# Patient Record
Sex: FEMALE
[De-identification: names, ages, dates, MRNs, and addresses within clinical notes are randomized per-mention and may not be internally consistent; named-entity substitution may affect disease eponyms.]

---

## 2019-09-01 ENCOUNTER — HOSPITAL (OUTPATIENT)
Dept: OTHER | Age: 64
End: 2019-09-01

## 2019-09-01 PROCEDURE — 99285 EMERGENCY DEPT VISIT HI MDM: CPT | Performed by: EMERGENCY MEDICINE

## 2019-12-27 ENCOUNTER — HOSPITAL ENCOUNTER (OUTPATIENT)
Dept: ULTRASOUND IMAGING | Age: 64
Discharge: HOME | End: 2019-12-27
Attending: FAMILY MEDICINE
Payer: MEDICARE

## 2019-12-27 DIAGNOSIS — R10.11 RUQ PAIN: ICD-10-CM

## 2019-12-27 PROCEDURE — 76705 ECHO EXAM OF ABDOMEN: CPT | Performed by: FAMILY MEDICINE

## 2020-01-22 ENCOUNTER — OFFICE VISIT (OUTPATIENT)
Dept: GASTROENTEROLOGY | Facility: CLINIC | Age: 65
End: 2020-01-22
Payer: COMMERCIAL

## 2020-01-22 ENCOUNTER — TELEPHONE (OUTPATIENT)
Dept: GASTROENTEROLOGY | Facility: CLINIC | Age: 65
End: 2020-01-22

## 2020-01-22 VITALS
BODY MASS INDEX: 42.46 KG/M2 | HEART RATE: 70 BPM | WEIGHT: 210.63 LBS | SYSTOLIC BLOOD PRESSURE: 129 MMHG | DIASTOLIC BLOOD PRESSURE: 77 MMHG | HEIGHT: 59 IN

## 2020-01-22 DIAGNOSIS — Z86.010 HISTORY OF COLON POLYPS: ICD-10-CM

## 2020-01-22 DIAGNOSIS — K62.5 RECTAL BLEEDING: ICD-10-CM

## 2020-01-22 DIAGNOSIS — K21.9 GASTROESOPHAGEAL REFLUX DISEASE, ESOPHAGITIS PRESENCE NOT SPECIFIED: ICD-10-CM

## 2020-01-22 DIAGNOSIS — R07.89 ATYPICAL CHEST PAIN: Primary | ICD-10-CM

## 2020-01-22 DIAGNOSIS — K21.9 GASTROESOPHAGEAL REFLUX DISEASE, ESOPHAGITIS PRESENCE NOT SPECIFIED: Primary | ICD-10-CM

## 2020-01-22 DIAGNOSIS — Z86.010 HISTORY OF ADENOMATOUS POLYP OF COLON: ICD-10-CM

## 2020-01-22 DIAGNOSIS — R07.89 ATYPICAL CHEST PAIN: ICD-10-CM

## 2020-01-22 DIAGNOSIS — R10.13 DYSPEPSIA: ICD-10-CM

## 2020-01-22 DIAGNOSIS — R13.10 DYSPHAGIA, UNSPECIFIED TYPE: ICD-10-CM

## 2020-01-22 PROCEDURE — 99204 OFFICE O/P NEW MOD 45 MIN: CPT | Performed by: INTERNAL MEDICINE

## 2020-01-22 RX ORDER — CARVEDILOL 12.5 MG/1
12.5 TABLET ORAL DAILY
COMMUNITY
Start: 2019-12-05

## 2020-01-22 RX ORDER — POLYETHYLENE GLYCOL 3350, SODIUM CHLORIDE, POTASSIUM CHLORIDE, SODIUM BICARBONATE, AND SODIUM SULFATE 240; 5.84; 2.98; 6.72; 22.72 G/4L; G/4L; G/4L; G/4L; G/4L
POWDER, FOR SOLUTION ORAL
Qty: 1 BOTTLE | Refills: 0 | Status: SHIPPED | OUTPATIENT
Start: 2020-01-22

## 2020-01-22 RX ORDER — PRAVASTATIN SODIUM 10 MG
10 TABLET ORAL DAILY
COMMUNITY
Start: 2019-11-18

## 2020-01-22 RX ORDER — IRBESARTAN AND HYDROCHLOROTHIAZIDE 300; 12.5 MG/1; MG/1
1 TABLET, FILM COATED ORAL DAILY
COMMUNITY
Start: 2019-09-26

## 2020-01-22 RX ORDER — LEVOTHYROXINE SODIUM 0.1 MG/1
1 TABLET ORAL DAILY
COMMUNITY
Start: 2019-12-04

## 2020-01-22 NOTE — PATIENT INSTRUCTIONS
Schedule EGD & colonoscopy exam at [facility] ([facility].)    This patient IS NOT appropriate for the [facility] ED endoscopy center. Body mass index is 42.54 kg/m².     MAC anesthesia     Golytely (PEG) 4L bowel prep --> [facility] [place]

## 2020-01-22 NOTE — PROGRESS NOTES
HPI:    Patient ID: Km Garcia is a 71year old woman with history hypertension, dyslipidemia, thyroid disease, BMI 42.5.     She is referred to us by Dr. Rupa Wilson for evaluation of recent abdominal symptoms, family history of \"stomach cancer\" in her mother Normal.  Common bile duct measures 5.6 mm. PANCREAS:      Normal.  No mass or ductal dilatation. RIGHT KIDNEY:             Visualized portions appear normal. The kidney length measures 11.44 cm. CONCLUSION:   1. Fatty liver.            DICTATED BY polypectomy. We discussed sedation options of conscious sedation versus MAC anesthesia, and agreed on MAC anesthesia.  We discussed the nature and risks of EGD and colonoscopy examination including sedation, anesthesia risks; bleeding, colonic injury or per

## 2020-01-22 NOTE — TELEPHONE ENCOUNTER
Scheduled for:  Colonoscopy [phone] ,[address]  Provider Name: Spencer Bryan  Date:  3/5/2020  Location:  [facility]  Sedation:  Mac  Time:  4080 ---- Inform pt that she will be notified on the day before prior to her arrival for procedure  Prep: Golytely   Meds/Allerg

## 2020-01-23 PROBLEM — K21.9 GASTROESOPHAGEAL REFLUX DISEASE: Status: ACTIVE | Noted: 2020-01-23

## 2020-01-23 PROBLEM — R07.89 ATYPICAL CHEST PAIN: Status: ACTIVE | Noted: 2020-01-23

## 2020-03-04 ENCOUNTER — TELEPHONE (OUTPATIENT)
Dept: GASTROENTEROLOGY | Facility: CLINIC | Age: 65
End: 2020-03-04

## 2020-03-04 NOTE — TELEPHONE ENCOUNTER
Recalled patient to check bp she states its 205/98. Instructed to proceed to immediate/urgent care. States  will drive her. Will update us if procedure tomorrow needs to be canceled.

## 2020-03-04 NOTE — TELEPHONE ENCOUNTER
Dr. Margarita Perez-    Pt's BP is 205/98. She will be going to urgent care for further work up. Has CLN tomorrow @ 3829. Should we cancel the procedure.      Thank You

## 2020-03-04 NOTE — TELEPHONE ENCOUNTER
Spoke to patient she did not take her BP medication today. She will take her BP now and recheck the BP in one hour if it is still high she will call me back. She denies HA, dizziness, chest pain SOB. She is to hold her BP medication tomorrow only.  She v

## 2020-03-04 NOTE — TELEPHONE ENCOUNTER
I spoke to the pt.      She went to urgent care     Her B/P @ Urgent Care was 170/80    Per Dr Brien Nguyen we should still cancel    I spoke to the pt and she is aware her procedure is cancelled for tomorrow    I told her the surgery schedulers will get back in

## 2020-03-05 NOTE — TELEPHONE ENCOUNTER
Noted, I contacted Donis Hammans RN at [facility] and OMP sent . Forwarded to schedulers to reschedule. Thanks.

## 2020-03-06 NOTE — TELEPHONE ENCOUNTER
Dr. Shoaib Higgins - please place order for another Golytely prep. Pt already opened & mixed her original prep. [facility] has been added. Thank you!

## 2020-03-06 NOTE — TELEPHONE ENCOUNTER
Scheduled for:  Colonoscopy - 92614 & EGD - 88215  Provider Name:  Dr. Elena Nguyen  Date:  4/16/20  Location:  [facility]  Sedation:  MAC  Time:  Approx 9:30 am (pt is aware that [facility] will call with exact arrival time)  Prep:  Golytely, Prep instructions were given to

## 2020-04-06 ENCOUNTER — TELEPHONE (OUTPATIENT)
Dept: GASTROENTEROLOGY | Facility: CLINIC | Age: 65
End: 2020-04-06

## 2020-04-06 DIAGNOSIS — R07.89 ATYPICAL CHEST PAIN: ICD-10-CM

## 2020-04-06 DIAGNOSIS — Z86.010 HISTORY OF COLON POLYPS: ICD-10-CM

## 2020-04-06 DIAGNOSIS — R10.13 DYSPEPSIA: ICD-10-CM

## 2020-04-06 DIAGNOSIS — R13.10 DYSPHAGIA, UNSPECIFIED TYPE: ICD-10-CM

## 2020-04-06 DIAGNOSIS — K21.9 GASTROESOPHAGEAL REFLUX DISEASE, ESOPHAGITIS PRESENCE NOT SPECIFIED: Primary | ICD-10-CM

## 2020-04-06 DIAGNOSIS — K62.5 RECTAL BLEEDING: ICD-10-CM

## 2020-04-06 NOTE — TELEPHONE ENCOUNTER
Rescheduled for:  Colonoscopy - 50824; [phone]  Provider Name:  Dr. Oneil Constant  Date: From: 4/16/20 To: 06/25/2020  Location:  [facility]  Sedation:  MAC  Time: From:9:30 am To: 1:45pm   Prep:  Mary Ellen, Prep instructions were given to pt over the phone, pt verbali

## 2020-04-06 NOTE — TELEPHONE ENCOUNTER
Patient requesting to reschedule colonoscopy and EGD scheduled 7/37/42 due to public health concerns. Patient requesting to reschedule for June-July, informed about the 72 hour call back, thanks.

## 2020-04-21 ENCOUNTER — TELEPHONE (OUTPATIENT)
Dept: GASTROENTEROLOGY | Facility: CLINIC | Age: 65
End: 2020-04-21

## 2020-04-21 DIAGNOSIS — K62.5 RECTAL BLEEDING: ICD-10-CM

## 2020-04-21 DIAGNOSIS — Z86.010 HISTORY OF COLON POLYPS: ICD-10-CM

## 2020-04-21 DIAGNOSIS — R07.89 ATYPICAL CHEST PAIN: ICD-10-CM

## 2020-04-21 DIAGNOSIS — R13.10 DYSPHAGIA, UNSPECIFIED TYPE: ICD-10-CM

## 2020-04-21 DIAGNOSIS — K21.9 GASTROESOPHAGEAL REFLUX DISEASE, ESOPHAGITIS PRESENCE NOT SPECIFIED: Primary | ICD-10-CM

## 2020-04-21 RX ORDER — PANTOPRAZOLE SODIUM 40 MG/1
40 TABLET, DELAYED RELEASE ORAL 2 TIMES DAILY
Qty: 60 TABLET | Refills: 11 | Status: SHIPPED | OUTPATIENT
Start: 2020-04-21 | End: 2020-05-21

## 2020-04-21 NOTE — TELEPHONE ENCOUNTER
Recent consultation of 1/22/2020 reviewed. Severe GERD symptoms, lower substernal burning chest pain and epigastric abdominal pain. 1.  Yes, we should absolutely try a PPI medication. Prescription sent in today for pantoprazole twice daily.   Ideally s

## 2020-04-21 NOTE — TELEPHONE ENCOUNTER
Forwarded to GI schedulers. Thanks. Patient contacted and informed of below message/instructions and that schedulers will be calling to get her in earlier than current June appt.

## 2020-04-21 NOTE — TELEPHONE ENCOUNTER
Dr Dawson Hannon, patient contacted. You saw in office 1/22/2020. She was to have 4/16 egd/colon but rescheduled to 6/25 due to COVID 19. States her epigastric pain is worsening, especially after meals. Has gall bladder. She is not taking any PPIs.  Denies fever,

## 2020-04-22 NOTE — TELEPHONE ENCOUNTER
Rescheduled for:  Colonoscopy [phone] and EGD 16303  Provider Name:  Dr. Kanu Noland  Date:    From-6/25/20  To-5/26/20  [facility]  Sedation:  MAC  Time:    From-1345  To-1500 (pt is aware to arrive at 1400)   Prep:  Raina Hyde n

## 2020-04-22 NOTE — TELEPHONE ENCOUNTER
Dr. Feliciano Reid-    When I scheduled this patient I informed her to not take her Irbesartan-HCTZ but she stated that the last time she scheduled her procedure she did hold her BP meds and she ended up with 205/98 BP and was sent to the immediate care which the

## 2020-05-22 ENCOUNTER — TELEPHONE (OUTPATIENT)
Dept: GASTROENTEROLOGY | Facility: CLINIC | Age: 65
End: 2020-05-22

## 2020-05-22 NOTE — PAT NURSING NOTE
Pt.unable to locate prep instructions for Colonoscopy. Spoke with [facility] from Dr. Tom Santos office, who said she will call pt.with instructions.

## 2020-05-22 NOTE — TELEPHONE ENCOUNTER
Patient states she is having issues obtaining instructions. Requesting to speak with RN. Please call. Thank you.

## 2020-05-22 NOTE — TELEPHONE ENCOUNTER
Per Endo nurse, patient needs prep instructions sent to her please. Foreign has colonoscopy scheduled for 05/26/2020. Patient speaks [place]. Please follow up. Thank you.

## 2020-05-25 ENCOUNTER — LAB ENCOUNTER (OUTPATIENT)
Dept: LAB | Facility: HOSPITAL | Age: 65
End: 2020-05-25
Attending: INTERNAL MEDICINE
Payer: MEDICARE

## 2020-05-25 DIAGNOSIS — R13.10 DYSPHAGIA: ICD-10-CM

## 2020-05-25 DIAGNOSIS — K21.9 GASTROESOPHAGEAL REFLUX DISEASE, ESOPHAGITIS PRESENCE NOT SPECIFIED: ICD-10-CM

## 2020-05-25 DIAGNOSIS — K62.5 RECTAL BLEEDING: ICD-10-CM

## 2020-05-25 DIAGNOSIS — K20.90 ESOPHAGITIS: ICD-10-CM

## 2020-05-25 DIAGNOSIS — K63.5 COLON POLYPS: ICD-10-CM

## 2020-05-26 ENCOUNTER — TELEPHONE (OUTPATIENT)
Dept: GASTROENTEROLOGY | Facility: CLINIC | Age: 65
End: 2020-05-26

## 2020-05-26 ENCOUNTER — ANESTHESIA (OUTPATIENT)
Dept: ENDOSCOPY | Facility: HOSPITAL | Age: 65
End: 2020-05-26
Payer: MEDICARE

## 2020-05-26 ENCOUNTER — HOSPITAL ENCOUNTER (OUTPATIENT)
Facility: HOSPITAL | Age: 65
Setting detail: HOSPITAL OUTPATIENT SURGERY
Discharge: HOME | End: 2020-05-26
Attending: INTERNAL MEDICINE | Admitting: INTERNAL MEDICINE
Payer: MEDICARE

## 2020-05-26 ENCOUNTER — ANESTHESIA EVENT (OUTPATIENT)
Dept: ENDOSCOPY | Facility: HOSPITAL | Age: 65
End: 2020-05-26
Payer: MEDICARE

## 2020-05-26 VITALS
RESPIRATION RATE: 14 BRPM | WEIGHT: 210 LBS | HEART RATE: 74 BPM | HEIGHT: 60 IN | SYSTOLIC BLOOD PRESSURE: 122 MMHG | OXYGEN SATURATION: 98 % | BODY MASS INDEX: 41.23 KG/M2 | TEMPERATURE: 98 F | DIASTOLIC BLOOD PRESSURE: 85 MMHG

## 2020-05-26 DIAGNOSIS — R13.10 DYSPHAGIA: ICD-10-CM

## 2020-05-26 DIAGNOSIS — R07.89 ATYPICAL CHEST PAIN: ICD-10-CM

## 2020-05-26 DIAGNOSIS — K20.90 ESOPHAGITIS: ICD-10-CM

## 2020-05-26 DIAGNOSIS — R13.10 DYSPHAGIA, UNSPECIFIED TYPE: ICD-10-CM

## 2020-05-26 DIAGNOSIS — K62.5 RECTAL BLEEDING: ICD-10-CM

## 2020-05-26 DIAGNOSIS — Z86.010 HISTORY OF COLON POLYPS: ICD-10-CM

## 2020-05-26 DIAGNOSIS — K63.5 COLON POLYPS: ICD-10-CM

## 2020-05-26 DIAGNOSIS — K21.9 GASTROESOPHAGEAL REFLUX DISEASE, ESOPHAGITIS PRESENCE NOT SPECIFIED: Primary | ICD-10-CM

## 2020-05-26 PROCEDURE — 45385 COLONOSCOPY W/LESION REMOVAL: CPT | Performed by: INTERNAL MEDICINE

## 2020-05-26 PROCEDURE — 0DBP8ZX EXCISION OF RECTUM, VIA NATURAL OR ARTIFICIAL OPENING ENDOSCOPIC, DIAGNOSTIC: ICD-10-PCS | Performed by: INTERNAL MEDICINE

## 2020-05-26 PROCEDURE — 43239 EGD BIOPSY SINGLE/MULTIPLE: CPT | Performed by: INTERNAL MEDICINE

## 2020-05-26 PROCEDURE — 0DB78ZX EXCISION OF STOMACH, PYLORUS, VIA NATURAL OR ARTIFICIAL OPENING ENDOSCOPIC, DIAGNOSTIC: ICD-10-PCS | Performed by: INTERNAL MEDICINE

## 2020-05-26 PROCEDURE — 0DB68ZX EXCISION OF STOMACH, VIA NATURAL OR ARTIFICIAL OPENING ENDOSCOPIC, DIAGNOSTIC: ICD-10-PCS | Performed by: INTERNAL MEDICINE

## 2020-05-26 RX ORDER — SODIUM CHLORIDE, SODIUM LACTATE, POTASSIUM CHLORIDE, CALCIUM CHLORIDE 600; 310; 30; 20 MG/100ML; MG/100ML; MG/100ML; MG/100ML
INJECTION, SOLUTION INTRAVENOUS CONTINUOUS
Status: DISCONTINUED | OUTPATIENT
Start: 2020-05-26 | End: 2020-05-26

## 2020-05-26 NOTE — ANESTHESIA PREPROCEDURE EVALUATION
Anesthesia PreOp Note    HPI:     Sridevi Lang is a 76year old female who presents for preoperative consultation requested by: Elliott Wang MD    Date of Surgery: 5/26/2020    Procedure(s):  COLONOSCOPY  ESOPHAGOGASTRODUODENOSCOPY (EGD)  Indica Juan Carlisle MD, Last Rate: 20 mL/hr at 05/26/20 1464    No current Epic-ordered outpatient medications on file. No Known Allergies    History reviewed. No pertinent family history.   Social History    Socioeconomic History      Marital status:       S (abnormal). Her respiration is 24 and oxygen saturation is 96%.     05/22/20  0947 05/26/20  1442   BP:  158/74   Pulse:  (!) 24   Resp:  24   Temp:  97.9 °F (36.6 °C)   TempSrc:  Oral   SpO2:  96%   Weight: 95.3 kg (210 lb)    Height: 1.524 m (5')

## 2020-05-26 NOTE — H&P
History & Physical Examination    Patient Name: Zaki Sam  MRN: [id number]  CSN: [id number]  YOB: 1955    Diagnosis: GERD, atypical chest pain, dysphagia, dyspepsia, history or colon polyps, rectal bleeding    Present Illness:     76 year ol Disp: , Rfl: , 5/24/2020  PEG 3350-KCl-NaBcb-NaCl-NaSulf (COLYTE WITH FLAVOR PACKS) 240 g Oral Recon Soln, Take as directed in the colonoscopy instructions. , Disp: 4000 mL, Rfl: 0  PEG 3350-KCl-NaBcb-NaCl-NaSulf (PEG 3350/ELECTROLYTES) 240 g Oral Recon Sol

## 2020-05-26 NOTE — ANESTHESIA POSTPROCEDURE EVALUATION
Patient: Todd Peña    Procedure Summary     Date:  05/26/20 Room / Location:  [facility] ENDOSCOPY 04 / [facility] ENDOSCOPY    Anesthesia Start:  4005 Anesthesia Stop:  1600    Procedures:       COLONOSCOPY (N/A )      ESOPHAGOGASTRODUODENOSCOPY (EGD) (N/A ) Diagnosis

## 2020-05-26 NOTE — OPERATIVE REPORT
EGD AND COLONOSCOPY PROCEDURE REPORTS:    DATE OF PROCEDURE:  5/26/2020    PCP: Kassy Mosqueda MD     PREOPERATIVE DIAGNOSIS:  GERD, atypical chest pain, dysphagia, dyspepsia, history or colon polyps, rectal bleeding     POSTOPERATIVE DIAGNOSIS:  See im ascending colon to the hepatic flexure. Cecum was confirmed by landmarks, including appendiceal orifice, cecal trifold, ileocecal valve. Retroflexion was performed in the rectum. The quality of the prep was excellent.      COLONOSCOPY FINDINGS:  · Sess

## 2020-06-22 ENCOUNTER — TELEPHONE (OUTPATIENT)
Dept: GASTROENTEROLOGY | Facility: CLINIC | Age: 65
End: 2020-06-22

## 2020-06-22 NOTE — TELEPHONE ENCOUNTER
Lab letter mailed out to patient and recall entered for repeat Colon in 7 yrs,5/26/2027 and Egd in 5 yrs,5/26/2025 per . Bhakti Lane MD  [facility] Clinical Staff             GI RNs - 1.  Please print and mail this letter to patient;

## 2020-06-22 NOTE — TELEPHONE ENCOUNTER
Recall for repeat Egd in 5 yrs, 5/26/2025 per . Loretta Ramírez MD  [facility] Clinical Staff             GI RNs - 1. Please print and mail this letter to patient; 2. Recall for EGD exam in 5 years, 3.  recall for colonoscopy examination in

## 2024-10-14 ENCOUNTER — TELEPHONE (OUTPATIENT)
Facility: CLINIC | Age: 69
End: 2024-10-14

## 2024-10-14 NOTE — TELEPHONE ENCOUNTER
Patient called to find out when she is due for colonoscopy/Esophagogastroduodenoscopy.  Please call.

## 2024-10-16 NOTE — TELEPHONE ENCOUNTER
Last Procedure, Date, MD:  05/26/2020 Dr Martin, EGD Colon  Last Diagnosis:  see path report below  Recalled (mth/yrs): 5 years for EGD, 7-10 years for colonoscopy  Sedation Used Previously:  MAC  Last Prep Used (if known):    Quality Of Prep (if known): excellent  Anticoagulants:no  Diabetic Med's (PO/Injectables): no  Weight loss Med's: no  Iron/Herbal/Multivitamin Supplements (RX/OTC): no  Marijuana/Vaping/CBD:no  Height & Weight: 5'/228 lbs  BMI: 44.5  Hx of Cardiac/CVA Issues/(MI/Stroke): no  Devices Pacemaker/Defibrillator/Stents: no  Respiratory Issues/Oxygen Use/ADÁN/COPD: ADÁN/CPAP  Issues w/ Anesthesia: no    Symptoms (Y/N):  pt is due for EGD in 2025. She is experiencing blood in her stool and asking if she can have a colonoscopy with the EGD      Special Comments/Notes:LOV with Dr Gordon was 08/30/2024. Medications allergies and pharmacy verified with the pt    Please advise on orders and prep.     Thank you!     Jason Martin MD   Physician  Gastroenterology     Operative Report  Addendum     Date of Service: 5/26/2020  3:05 PM  Case Time: Procedures: Surgeons:   5/26/2020  3:23 PM COLONOSCOPY   ESOPHAGOGASTRODUODENOSCOPY (EGD)    Jason Martin MD                 EGD AND COLONOSCOPY PROCEDURE REPORTS:     DATE OF PROCEDURE:  5/26/2020     PCP: LENORE GORDON MD     PREOPERATIVE DIAGNOSIS:  GERD, atypical chest pain, dysphagia, dyspepsia, history or colon polyps, rectal bleeding     POSTOPERATIVE DIAGNOSIS:  See impression.     SURGEON:  Jason Martin M.D.     SEDATION:    MAC anesthesia provided by the Anesthesia Service.  MAC anesthesia requested due to  anticipated intolerance of the EGD and colonoscopy examination under safe doses conscious sedation medications     Body mass index is 41.01 kg/m².      EGD PROCEDURE:    After the nature and risks of EGD and colonoscopy examinations under MAC anesthesia were discussed with the patient and all questions answered,  informed consent was obtained.  The patient was sedated as above.       Once sedated, the Olympus adult diagnostic gastroscope was placed in the patient's mouth and advanced under direct visualization through the oropharynx into the esophagus, on down through the stomach and pylorus to the duodenal bulb and second portions of the duodenum.  Retroflexion was performed in the stomach.     EGD FINDINGS:    Esophagus and GE junction: 3+ cm hiatal hernia without active reflux esophagitis.  Photograph taken.     Stomach: Clear secretions present.  Mild diffuse atrophic gastritis proximally and distally.  No erosion or ulceration.  Random gastric mucosal biopsies taken circumferentially and a segmental fashion, fundus, body, antrum today to evaluate family history of gastric cancer.     Duodenum: Clear secretions present.  Normal duodenal bulb and second portion.     COLONOSCOPY PROCEDURE:    The patient was repositioned for colonoscopy examination.  Additional sedation given.  Digital rectal exam was performed, which showed no masses.  The Olympus pediatric video colonoscope was advanced under direct visualization through the entire length of the colon to the cecum and terminal ileum.  Retroflex exam performed up the ascending colon to the hepatic flexure.  Cecum was confirmed by landmarks, including appendiceal orifice, cecal trifold, ileocecal valve.  Retroflexion was performed in the rectum.     The quality of the prep was excellent.     COLONOSCOPY FINDINGS:  Sessile 6 mm polyp removed from the rectum by cold snare polypectomy, suctioned out.  Small internal hemorrhoids.     IMPRESSION:  Small to medium sized hiatal hernia without active reflux esophagitis as above.  Mild atrophic gastritis in a patient with family history of gastric cancer.  Gastric mucosal biopsies obtained.  Single small colon polyp removed from the rectum as above.     RECOMMENDATIONS:  Followup above gastric mucosal biopsy results.  Follow-up  above colon polyp pathology results.  High-fiber diet.  Consider repeat EGD examination in 5 years due to family history gastric cancer in a parent.  Repeat colonoscopy examination in 5-7 years, or as per family history moving forward.         Electronically signed by Jason Martin MD at 5/26/2020  4:06 PM  ==========================  Final Diagnosis:      A.  Gastric antrum; biopsy:  Fragments of gastric mucosa demonstrating minimal/mild chronic inactive gastritis.   No evidence of gastric glandular atrophy, intestinal metaplasia, epithelial dysplasia, or malignancy identified.   No evidence of bacterial organisms consistent with Helicobacter species seen on Giemsa stain (with appropriate control reactivity).     B.  Gastric body; biopsy:  Fragments of gastric mucosa demonstrating minimal chronic inactive gastritis.   No evidence of gastric glandular atrophy, intestinal metaplasia, epithelial dysplasia, or malignancy identified.   No evidence of bacterial organisms consistent with Helicobacter species seen on Giemsa stain (with appropriate control reactivity).     C.  Gastric fundus; biopsy:  Fragments of gastric mucosa demonstrating minimal chronic inactive gastritis.   No evidence of gastric glandular atrophy, intestinal metaplasia, epithelial dysplasia, or malignancy identified.   No evidence of bacterial organisms consistent with Helicobacter species seen on Giemsa stain (with appropriate control reactivity).     D.  Rectal polyp; polypectomy:  Fragment of hyperplastic polyp.

## 2024-10-16 NOTE — TELEPHONE ENCOUNTER
Lab letter mailed out to patient and recall entered for repeat Colon in 7 yrs,5/26/2027 and Egd in 5 yrs,5/26/2025 per .     Jason Martin MD  P  Gi Clinical Staff              GI RNs - 1. Please print and mail this letter to patient; 2. Recall for EGD exam in 5 years, 3. recall for colonoscopy examination in 7 years.

## 2024-10-19 RX ORDER — POLYETHYLENE GLYCOL 3350, SODIUM SULFATE ANHYDROUS, SODIUM BICARBONATE, SODIUM CHLORIDE, POTASSIUM CHLORIDE 236; 22.74; 6.74; 5.86; 2.97 G/4L; G/4L; G/4L; G/4L; G/4L
4 POWDER, FOR SOLUTION ORAL ONCE
Qty: 1 EACH | Refills: 0 | Status: SHIPPED | OUTPATIENT
Start: 2024-10-19 | End: 2024-10-19

## 2024-10-19 NOTE — TELEPHONE ENCOUNTER
Thanks all.    Patient with family history of gastric cancer in a parent, atrophic gastritis.  I recommended every 5-year EGD examinations.    LOV with Dr Falk was 08/30/2024    My previous EGD and colonoscopy examinations 5/26/2020 reviewed.  Reassuring EGD examination showing atrophic gastritis; biopsies reassuring.  Colonoscopy examination showed small rectal polyp.    GI schedulers, please call Ms. Soto to schedule EGD examination.  Please advise for the colonoscopy examination is optional but not recommended.  Her previous exam 4.5 years ago showed one completely benign polyp, very reassuring.  If she is really that worried we can proceed with another colonoscopy if she insists.    GI schedulers, please call Ms. Soto to schedule EGD & ??colonoscopy exam at [facility]    4/1/2025 or later    BMI Readings from Last 1 Encounters:   05/22/20 41.01 kg/m²      MAC anesthesia     Golytely (PEG) 4L bowel prep  [facility]    Dx = dyspepsia, family history gastric cancer in a parent, rectal bleeding    Medication instructions:    None

## 2024-10-21 NOTE — TELEPHONE ENCOUNTER
Left message for patient to call back to schedule Colonoscopy/EGD     Please transfer call to GI surgery scheduling

## 2025-04-11 ENCOUNTER — TELEPHONE (OUTPATIENT)
Facility: CLINIC | Age: 70
End: 2025-04-11

## 2025-04-11 NOTE — TELEPHONE ENCOUNTER
Patient states that the [facility] has not received prescription for the colonoscopy preparation.

## 2025-04-11 NOTE — TELEPHONE ENCOUNTER
[employer] Interpretor #[id number] used to call the patient    I spoke to the patient    Patient requesting bowel prep be sent to the pharmacy    Patient would like bowel prep sent to [facility]    Bowel prep sent to [facility] on 10/19/2024    RN called and spoke to pharmacy staff at [facility]    RN informed pharmacy staff that bowel prep was sent on 10/19/2024 and that patient will need this filled for upcoming procedure    Pharmacy to have bowel prep ready today and notify patient when it is ready for pickup

## 2025-07-21 RX ORDER — LOSARTAN POTASSIUM 100 MG/1
100 TABLET ORAL DAILY
COMMUNITY

## 2025-07-21 RX ORDER — SPIRONOLACTONE 50 MG/1
50 TABLET, FILM COATED ORAL 2 TIMES DAILY
COMMUNITY

## 2025-07-25 ENCOUNTER — ANESTHESIA EVENT (OUTPATIENT)
Dept: ENDOSCOPY | Facility: HOSPITAL | Age: 70
End: 2025-07-25
Payer: MEDICARE

## 2025-07-25 ENCOUNTER — HOSPITAL ENCOUNTER (OUTPATIENT)
Facility: HOSPITAL | Age: 70
Setting detail: HOSPITAL OUTPATIENT SURGERY
Discharge: HOME | End: 2025-07-25
Attending: INTERNAL MEDICINE | Admitting: INTERNAL MEDICINE

## 2025-07-25 ENCOUNTER — ANESTHESIA (OUTPATIENT)
Dept: ENDOSCOPY | Facility: HOSPITAL | Age: 70
End: 2025-07-25
Payer: MEDICARE

## 2025-07-25 VITALS
WEIGHT: 230 LBS | HEART RATE: 69 BPM | DIASTOLIC BLOOD PRESSURE: 44 MMHG | OXYGEN SATURATION: 95 % | BODY MASS INDEX: 49.62 KG/M2 | HEIGHT: 57 IN | RESPIRATION RATE: 18 BRPM | SYSTOLIC BLOOD PRESSURE: 132 MMHG

## 2025-07-25 DIAGNOSIS — Z80.0 FAMILY HISTORY OF COLON CANCER: ICD-10-CM

## 2025-07-25 DIAGNOSIS — K62.5 RECTAL BLEEDING: ICD-10-CM

## 2025-07-25 PROBLEM — K64.8 INTERNAL HEMORRHOIDS: Status: ACTIVE | Noted: 2025-07-25

## 2025-07-25 PROCEDURE — 43239 EGD BIOPSY SINGLE/MULTIPLE: CPT | Performed by: INTERNAL MEDICINE

## 2025-07-25 PROCEDURE — 45385 COLONOSCOPY W/LESION REMOVAL: CPT | Performed by: INTERNAL MEDICINE

## 2025-07-25 RX ORDER — LIDOCAINE HYDROCHLORIDE 10 MG/ML
INJECTION, SOLUTION EPIDURAL; INFILTRATION; INTRACAUDAL; PERINEURAL AS NEEDED
Status: DISCONTINUED | OUTPATIENT
Start: 2025-07-25 | End: 2025-07-25 | Stop reason: SURG

## 2025-07-25 RX ORDER — SODIUM CHLORIDE, SODIUM LACTATE, POTASSIUM CHLORIDE, CALCIUM CHLORIDE 600; 310; 30; 20 MG/100ML; MG/100ML; MG/100ML; MG/100ML
INJECTION, SOLUTION INTRAVENOUS CONTINUOUS
Status: DISCONTINUED | OUTPATIENT
Start: 2025-07-25 | End: 2025-07-25

## 2025-07-25 RX ORDER — NALOXONE HYDROCHLORIDE 0.4 MG/ML
0.08 INJECTION, SOLUTION INTRAMUSCULAR; INTRAVENOUS; SUBCUTANEOUS ONCE AS NEEDED
Status: DISCONTINUED | OUTPATIENT
Start: 2025-07-25 | End: 2025-07-25

## 2025-07-25 RX ORDER — LIDOCAINE HYDROCHLORIDE 20 MG/ML
SOLUTION OROPHARYNGEAL AS NEEDED
Status: DISCONTINUED | OUTPATIENT
Start: 2025-07-25 | End: 2025-07-25 | Stop reason: SURG

## 2025-07-25 RX ADMIN — LIDOCAINE HYDROCHLORIDE 10 ML: 20 SOLUTION OROPHARYNGEAL at 12:41:00

## 2025-07-25 RX ADMIN — LIDOCAINE HYDROCHLORIDE 50 MG: 10 INJECTION, SOLUTION EPIDURAL; INFILTRATION; INTRACAUDAL; PERINEURAL at 12:46:00

## 2025-07-25 RX ADMIN — SODIUM CHLORIDE, SODIUM LACTATE, POTASSIUM CHLORIDE, CALCIUM CHLORIDE: 600; 310; 30; 20 INJECTION, SOLUTION INTRAVENOUS at 12:41:00

## 2025-07-25 RX ADMIN — SODIUM CHLORIDE, SODIUM LACTATE, POTASSIUM CHLORIDE, CALCIUM CHLORIDE: 600; 310; 30; 20 INJECTION, SOLUTION INTRAVENOUS at 13:25:00

## 2025-07-25 NOTE — H&P
History & Physical Examination    Patient Name: Izabella Soto  MRN: [id number]  [facility]: [id number]  YOB: 1955    Diagnosis: Dyspepsia, family history gastric cancer in a parent, rectal bleeding    PRESENT ILLNESS: 70-year-old woman with BMI 49.8, history of hypertension dyslipidemia known to me from previous exams who presents for EGD and colonoscopy examination.      BMI Readings from Last 1 Encounters:   25 49.77 kg/m²         Prescriptions Prior to Admission[1]  Current Hospital Medications[2]    Allergies: Allergies[3]    Past Medical History[4]  Past Surgical History[5]  Family History[6]  Social History     Tobacco Use    Smoking status: Former     Current packs/day: 0.00     Types: Cigarettes     Quit date: 1998     Years since quittin.5    Smokeless tobacco: Never   Substance Use Topics    Alcohol use: Never       SYSTEM Check if Review is Normal Check if Physical Exam is Normal If not normal, please explain:   HEENT [ ] [X ]    NECK & BACK [ ] [ ]    HEART [ X ] [ X ]    LUNGS [ X ] [ X ]    ABDOMEN [ X ] [ X ]    UROGENITAL [ ] [ ]    EXTREMITIES [ ] [ ]    OTHER        See Anesthesia documentation    [ x ] I have discussed the risks and benefits and alternatives with the patient/family.  They understand and agree to proceed with plan of care.  [ x ] I have reviewed the History and Physical done within the last 30 days.  Any changes noted above.    Jason Martin MD  2025  11:31 AM             [1]   Medications Prior to Admission   Medication Sig Dispense Refill Last Dose/Taking    losartan 100 MG Oral Tab Take 1 tablet (100 mg total) by mouth daily.   Taking    spironolactone 50 MG Oral Tab Take 1 tablet (50 mg total) by mouth 2 (two) times daily.   Taking    Omega-3 Fatty Acids (OMEGA 3 OR) Take by mouth.   Taking    Cholecalciferol (VITAMIN D3 OR) Take by mouth.   Taking    Ascorbic Acid (VITAMIN C OR) Take by mouth.   Taking    MAGNESIUM OR Take by mouth.   Taking     Cyanocobalamin (VITAMIN B 12 OR) Take by mouth.   Taking    ZINC OR Use as directed in the mouth or throat.   Taking    Menaquinone-7 (VITAMIN K2 OR) Take by mouth.   Taking    carvedilol 12.5 MG Oral Tab Take 2 tablets (25 mg total) by mouth 2 (two) times daily with meals.   Taking    Levothyroxine Sodium 100 MCG Oral Tab Take 1 tablet (100 mcg total) by mouth in the morning.   Taking    Pravastatin Sodium 10 MG Oral Tab Take 1 tablet (10 mg total) by mouth in the morning.   Taking    PEG 3350-KCl-NaBcb-NaCl-NaSulf (COLYTE WITH FLAVOR PACKS) 240 g Oral Recon Soln Take as directed in the colonoscopy instructions. 4000 mL 0     Irbesartan-hydroCHLOROthiazide 300-12.5 MG Oral Tab Take 1 tablet by mouth daily. (Patient not taking: Reported on 10/16/2024)       PEG 3350-KCl-NaBcb-NaCl-NaSulf (PEG 3350/ELECTROLYTES) 240 g Oral Recon Soln Take as directed according to colonoscopy instructions 1 Bottle 0    [2]   No current facility-administered medications for this encounter.   [3] No Known Allergies  [4]   Past Medical History:   Disorder of thyroid    Essential hypertension    High blood pressure    High cholesterol    Hyperlipidemia    Sleep apnea    CPAP    Thyroid disease   [5]   Past Surgical History:  Procedure Laterality Date    Colonoscopy  2018    Resurrection    Colonoscopy N/A 5/26/2020    Procedure: COLONOSCOPY;  Surgeon: Jason Martin MD;  Location: [facility] ENDOSCOPY    Thyroidectomy  2014   [6] History reviewed. No pertinent family history.

## 2025-07-25 NOTE — ANESTHESIA PREPROCEDURE EVALUATION
Anesthesia PreOp Note    HPI:     Izabella Soto is a 70 year old female who presents for preoperative consultation requested by: Jason Martin MD    Date of Surgery: 7/25/2025    Procedure(s):  COLONOSCOPY / ESOPHAGOGASTRODUODENOSCOPY  ESOPHAGOGASTRODUODENOSCOPY (EGD)  Indication: Family history of colon cancer /Rectal bleeding    Relevant Problems   No relevant active problems       NPO:  Last Liquid Consumption Date: 07/25/25  Last Liquid Consumption Time: 0800  Last Solid Consumption Date: 07/24/25  Last Solid Consumption Time: 0730  Last Liquid Consumption Date: 07/25/25          History Review:  Patient Active Problem List    Diagnosis Date Noted    Atypical chest pain 01/23/2020    Gastroesophageal reflux disease 01/23/2020       Past Medical History[1]    Past Surgical History[2]    Prescriptions Prior to Admission[3]  Current Medications and Prescriptions Ordered in Epic[4]    Allergies[5]    Family History[6]  Social Hx on file[7]    Available pre-op labs reviewed.             Vital Signs:  Body mass index is 49.77 kg/m².   height is 1.448 m (4' 9\") and weight is 104.3 kg (230 lb).   Vitals:    07/21/25 1625   Weight: 104.3 kg (230 lb)   Height: 1.448 m (4' 9\")        Anesthesia Evaluation     Patient summary reviewed and Nursing notes reviewed    Airway   Mallampati: II  Dental    (+) upper dentures and lower dentures    Pulmonary - normal exam   (+) sleep apnea  Cardiovascular - normal exam  (+) hypertension    Neuro/Psych      GI/Hepatic/Renal    (+) GERD, bowel prep    Endo/Other    (+) hypothyroidism  Abdominal   (+) obese                 Anesthesia Plan:   ASA:  3  Plan:   MAC  Informed Consent Plan and Risks Discussed With:  Patient      I have informed Izabella Soto and/or legal guardian or family member of the nature of the anesthetic plan, benefits, risks including possible dental damage if relevant, major complications, and any alternative forms of anesthetic management.   All of the  patient's questions were answered to the best of my ability. The patient desires the anesthetic management as planned.  Rocky Barclay, PONCHO  7/25/2025 11:49 AM  Present on Admission:  **None**           [1]   Past Medical History:   Disorder of thyroid    Essential hypertension    High blood pressure    High cholesterol    Hyperlipidemia    Sleep apnea    CPAP    Thyroid disease   [2]   Past Surgical History:  Procedure Laterality Date    Colonoscopy  2018    Resurrection    Colonoscopy N/A 5/26/2020    Procedure: COLONOSCOPY;  Surgeon: Jason Martin MD;  Location: [facility] ENDOSCOPY    Thyroidectomy  2014   [3]   Medications Prior to Admission   Medication Sig Dispense Refill Last Dose/Taking    losartan 100 MG Oral Tab Take 1 tablet (100 mg total) by mouth daily.   Taking    spironolactone 50 MG Oral Tab Take 1 tablet (50 mg total) by mouth 2 (two) times daily.   Taking    Omega-3 Fatty Acids (OMEGA 3 OR) Take by mouth.   Taking    Cholecalciferol (VITAMIN D3 OR) Take by mouth.   Taking    Ascorbic Acid (VITAMIN C OR) Take by mouth.   Taking    MAGNESIUM OR Take by mouth.   Taking    Cyanocobalamin (VITAMIN B 12 OR) Take by mouth.   Taking    ZINC OR Use as directed in the mouth or throat.   Taking    Menaquinone-7 (VITAMIN K2 OR) Take by mouth.   Taking    carvedilol 12.5 MG Oral Tab Take 2 tablets (25 mg total) by mouth 2 (two) times daily with meals.   Taking    Levothyroxine Sodium 100 MCG Oral Tab Take 1 tablet (100 mcg total) by mouth in the morning.   Taking    Pravastatin Sodium 10 MG Oral Tab Take 1 tablet (10 mg total) by mouth in the morning.   Taking    PEG 3350-KCl-NaBcb-NaCl-NaSulf (COLYTE WITH FLAVOR PACKS) 240 g Oral Recon Soln Take as directed in the colonoscopy instructions. 4000 mL 0     Irbesartan-hydroCHLOROthiazide 300-12.5 MG Oral Tab Take 1 tablet by mouth daily. (Patient not taking: Reported on 10/16/2024)       PEG 3350-KCl-NaBcb-NaCl-NaSulf (PEG 3350/ELECTROLYTES) 240 g Oral Recon  Soln Take as directed according to colonoscopy instructions 1 Bottle 0    [4]   Current Facility-Administered Medications Ordered in Epic   Medication Dose Route Frequency Provider Last Rate Last Admin    lactated ringers infusion   Intravenous Continuous Jason Martin MD        lactated ringers infusion   Intravenous Continuous Rocky Barclay CRNA        naloxone (Narcan) 0.4 MG/ML injection 0.08 mg  0.08 mg Intravenous Once PRN Rocky Barclay CRNA         No current [facility]-ordered outpatient medications on file.   [5] No Known Allergies  [6] History reviewed. No pertinent family history.  [7]   Social History  Socioeconomic History    Marital status:    Tobacco Use    Smoking status: Former     Current packs/day: 0.00     Types: Cigarettes     Quit date: 1998     Years since quittin.5    Smokeless tobacco: Never   Vaping Use    Vaping status: Never Used   Substance and Sexual Activity    Alcohol use: Never    Drug use: Never

## 2025-07-25 NOTE — DISCHARGE INSTRUCTIONS
.  .  .  Notes from Dr Martin:  Today's stomach endoscopy examination went well.  Your stomach again looked healthy.  I took some biopsies of the lining of your stomach to look for any mutations or danger.    You are likely having chronic acid reflux \"GERD\" irritation and damage to your lower esophagus from the hiatal hernia.  This is causing some chronic damage and scarring in your lower esophagus.  If this gets worse, you might begin to have difficulty swallowing and the sensation of food getting stuck on the way down.  If you are not already taking, we should consider having you take Prilosec/omeprazole or Protonix/pantoprazole antiacid medicine to prevent the damage in your esophagus from getting worse.  You could discuss this further with Dr. Falk.  3 benign colon polyps were found and removed today - to be sent to the lab to be examined - a letter will be sent with results.  You may see small quantities of blood with your next 1-2 bowel movements today.    If you check your results on [employer], the \"pathology\" report on the colon polyp(s) should be released within the next 24-48 hours.  In general, a \"hyperplastic\" colon polyp is completely benign, vs an \"adenoma\" or \"sessile serrated adenoma\" which is considered a benign but precancerous colon polyp.  That will be explained in a [employer] message from me as well.  No aspirin, Excedrin, Advil/Motrin/ibuprofen, Aleve/naproxen for next 5 days (to prevent bleeding.)  Internal hemorrhoids - very common  If you are raw and irritated back there after all of that diarrhea and wiping, three good options to soothe and heal the irritation include Aquaphor ointment, \"Desitin\" or \"A & D\" diaper ointment, or good old-fashioned Vaseline.  All of these soothe and create a protective barrier so that your skin can heal.  I recommend repeat EGD stomach endoscopy and colonoscopy exam in 5 years.  .  .  .

## 2025-07-25 NOTE — ANESTHESIA POSTPROCEDURE EVALUATION
Patient: Izabella Soto    Procedure Summary       Date: 07/25/25 Room / Location: [facility] ENDOSCOPY 03 / EM ENDOSCOPY    Anesthesia Start: 1241 Anesthesia Stop: 1326    Procedures:       COLONOSCOPY / ESOPHAGOGASTRODUODENOSCOPY      ESOPHAGOGASTRODUODENOSCOPY (EGD) Diagnosis:       Family history of colon cancer      Rectal bleeding      (reflex esophagitis, esophageal stricture, gastrictis, polyps, hemorrhoids)    Surgeons: Jason Martin MD Anesthesiologist: Rocky Barclay CRNA    Anesthesia Type: MAC ASA Status: 3            Anesthesia Type: MAC    Vitals Value Taken Time   /68 07/25/25 13:28   Temp 36.3 07/25/25 13:28   Pulse 72 07/25/25 13:28   Resp 14 07/25/25 13:28   SpO2 98 07/25/25 13:28       [facility] AN Post Evaluation:   Patient Evaluated in PACU  Patient Participation: complete - patient participated  Level of Consciousness: awake and awake and alert  Pain Score: 0  Pain Management: adequate  Airway Patency:patent  Dental exam unchanged from preop  Yes    Nausea/Vomiting: none  Cardiovascular Status: acceptable, blood pressure returned to baseline and stable  Respiratory Status: acceptable  Postoperative Hydration acceptable      Rocky Barclay CRNA  7/25/2025 1:28 PM

## 2025-07-25 NOTE — OPERATIVE REPORT
[facility]    EGD AND COLONOSCOPY PROCEDURE REPORTS:    DATE OF PROCEDURE:  7/25/2025    PCP: LENORE GORDON MD     PREOPERATIVE DIAGNOSIS:  Dyspepsia, family history gastric cancer in a parent, rectal bleeding     POSTOPERATIVE DIAGNOSIS:  See impression.     SURGEON:  Jason Martin M.D.     SEDATION:    MAC anesthesia provided by the Anesthesia Service.  MAC anesthesia requested due to  anticipated intolerance of the EGD and colonoscopy examination under safe doses conscious sedation medications      Estimated blood loss: none/insignificant       EGD PROCEDURE:    After the nature and risks of EGD and colonoscopy examinations under MAC anesthesia were discussed with the patient and all questions answered, informed consent was obtained.  The patient was sedated as above.      Once sedated, the Olympus adult diagnostic gastroscope was placed in the patient's mouth and advanced under direct visualization through the oropharynx into the esophagus, on down through the stomach and pylorus to the duodenal bulb and second portions of the duodenum.  Retroflexion was performed in the stomach.     EGD FINDINGS:    Esophagus and GE junction: Ringlike fibrotic stricture above a 3-4 cm hiatal hernia.  Biopsies obtained of some chronic inflammatory changes on the stricture.  This GE junction stricture did not meaningfully limit the esophageal diameter, therefore left alone today.      Stomach: Clear secretions present.  Mild patchy gastric mucosal erythema, inflammatory changes.  Random gastric mucosal biopsies again obtained gastric antrum and gastric body.    Duodenum: Clear secretions present. Normal duodenal bulb and second portion duodenum.    COLONOSCOPY PROCEDURE:    The patient was repositioned for colonoscopy examination.  Additional sedation given.  Digital rectal exam was performed, which showed no masses.  The Olympus pediatric video colonoscope was advanced under direct visualization  through the entire length of the colon to the cecum and terminal ileum.  Retroflex exam performed up the ascending colon to the hepatic flexure.  Cecum was confirmed by landmarks, including appendiceal orifice, cecal trifold, ileocecal valve.  Retroflexion was performed in the rectum.    The quality of the prep was excellent.     COLONOSCOPY FINDINGS:  2 x sessile 4 mm colon polyps removed from the lip of the cecum and mid ascending colon by cold snare polypectomy technique in forward and retroflexed positions, specimens retrieved.  Sessile 6 mm colon polyp found and removed from the proximal sigmoid colon by cold snare polypectomy technique, suctioned out.  Medium size internal hemorrhoids.     IMPRESSION:  Similar hiatal hernia with moderate severity chronic reflux esophagitis, early ringlike distal esophagus stricture at the Z-line above it  Reassuring exam of the stomach today in this patient with family history of gastric cancer in a parent.  Random gastric mucosal biopsies obtained.  2 x sessile 4 mm colon polyps removed from the lip of the cecum and mid ascending colon by cold snare polypectomy technique in forward and retroflexed positions, specimens retrieved.  Sessile 6 mm colon polyp found and removed from the proximal sigmoid colon by cold snare polypectomy technique, suctioned out.  Medium size internal hemorrhoids.     RECOMMENDATIONS:  Follow-up above GE junction distal esophagus stricture biopsies  Followup above gastric mucosal biopsy results.  Consideration for PPI therapy to prevent progression of this fibrotic esophageal stricture due to hiatal hernia, obesity  Follow-up above colon polyp pathology results.  High-fiber diet.  Repeat colonoscopy examination in 5 years, or as per family history moving forward.  No aspirin or NSAID medications for the next 5 days to prevent bleeding

## 2025-08-25 ENCOUNTER — TELEPHONE (OUTPATIENT)
Facility: CLINIC | Age: 70
End: 2025-08-25

## (undated) DEVICE — Device: Brand: CUSTOM PROCEDURE KIT

## (undated) DEVICE — KIT CLEAN ENDOKIT 1.1OZ GOWNX2

## (undated) DEVICE — STERILE LATEX POWDER-FREE SURGICAL GLOVESWITH NITRILE COATING: Brand: PROTEXIS

## (undated) DEVICE — NEEDLE CONTRAST INTERJECT 25G

## (undated) DEVICE — FORCEPS BX LG 2.4MM X 240CM NDL RAD JAW 4

## (undated) DEVICE — KIT MFLD FOR SPEC COLL

## (undated) DEVICE — MEDI-VAC NON-CONDUCTIVE SUCTION TUBING 6MM X 1.8M (6FT.) L: Brand: CARDINAL HEALTH

## (undated) DEVICE — SNARE CAPTIFLEX MICRO-OVL OLY

## (undated) DEVICE — 35 ML SYRINGE REGULAR TIP: Brand: MONOJECT

## (undated) DEVICE — LINE MNTR ADLT SET O2 INTMD

## (undated) DEVICE — TRAP MCS 40ML 5IN PLS SCR CAP

## (undated) DEVICE — CONMED SCOPE SAVER BITE BLOCK, 20X27 MM: Brand: SCOPE SAVER

## (undated) DEVICE — FORCEP RADIAL JAW 4

## (undated) DEVICE — Device: Brand: DEFENDO AIR/WATER/SUCTION AND BIOPSY VALVE

## (undated) DEVICE — Device

## (undated) DEVICE — KIT ENDO ORCAPOD 160/180/190

## (undated) DEVICE — TUBING SCT CLR 6FT .25IN MDVC

## (undated) DEVICE — BLOCK BITE LG LUMN 20X27MM GRN DISP

## (undated) NOTE — LETTER
[facility] [address], [place], [place]    Authorization for Surgical Operation and Procedure                               I hereby authorize Jason Martin MD, my physician and his/her assistants (if applicable), which may include medical students, residents, and/or fellows, to perform the following surgical operation/ procedure and administer such anesthesia as may be determined necessary by my physician: Operation/Procedure name (s) COLONOSCOPY / ESOPHAGOGASTRODUODENOSCOPY on Izabella Soto   2.   I recognize that during the surgical operation/procedure, unforeseen conditions may necessitate additional or different procedures than those listed above.  I, therefore, further authorize and request that the above-named surgeon, assistants, or designees perform such procedures as are, in their judgment, necessary and desirable.    3.   My surgeon/physician has discussed prior to my surgery the potential benefits, risks and side effects of this procedure; the likelihood of achieving goals; and potential problems that might occur during recuperation.  They also discussed reasonable alternatives to the procedure, including risks, benefits, and side effects related to the alternatives and risks related to not receiving this procedure.  I have had all my questions answered and I acknowledge that no guarantee has been made as to the result that may be obtained.    4.   Should the need arise during my operation/procedure, which includes change of level of care prior to discharge, I also consent to the administration of blood and/or blood products.  Further, I understand that despite careful testing and screening of blood or blood products by collecting agencies, I may still be subject to ill effects as a result of receiving a blood transfusion and/or blood products.  The following are some, but not all, of the potential risks that can occur: fever and allergic reactions, hemolytic reactions,  transmission of diseases such as Hepatitis, AIDS and Cytomegalovirus (CMV) and fluid overload.  In the event that I wish to have an autologous transfusion of my own blood, or a directed donor transfusion, I will discuss this with my physician.  Check only if Refusing Blood or Blood Products  I understand refusal of blood or blood products as deemed necessary by my physician may have serious consequences to my condition to include possible death. I hereby assume responsibility for my refusal and release the hospital, its personnel, and my physicians from any responsibility for the consequences of my refusal.    o  Refuse   5.   I authorize the use of any specimen, organs, tissues, body parts or foreign objects that may be removed from my body during the operation/procedure for diagnosis, research or teaching purposes and their subsequent disposal by hospital authorities.  I also authorize the release of specimen test results and/or written reports to my treating physician on the hospital medical staff or other referring or consulting physicians involved in my care, at the discretion of the Pathologist or my treating physician.    6.   I consent to the photographing or videotaping of the operations or procedures to be performed, including appropriate portions of my body for medical, scientific, or educational purposes, provided my identity is not revealed by the pictures or by descriptive texts accompanying them.  If the procedure has been photographed/videotaped, the surgeon will obtain the original picture, image, videotape or CD.  The hospital will not be responsible for storage, release or maintenance of the picture, image, tape or CD.    7.   I consent to the presence of a  or observers in the operating room as deemed necessary by my physician or their designees.    8.   I recognize that in the event my procedure results in extended X-Ray/fluoroscopy time, I may develop a skin reaction.    9. If  I have a Do Not Attempt Resuscitation (DNAR) order in place, that status will be suspended while in the operating room, procedural suite, and during the recovery period unless otherwise explicitly stated by me (or a person authorized to consent on my behalf). The surgeon or my attending physician will determine when the applicable recovery period ends for purposes of reinstating the DNAR order.  10. Patients having a sterilization procedure: I understand that if the procedure is successful the results will be permanent and it will therefore be impossible for me to inseminate, conceive, or bear children.  I also understand that the procedure is intended to result in sterility, although the result has not been guaranteed.   11. I acknowledge that my physician has explained sedation/analgesia administration to me including the risk and benefits I consent to the administration of sedation/analgesia as may be necessary or desirable in the judgment of my physician.    I CERTIFY THAT I HAVE READ AND FULLY UNDERSTAND THE ABOVE CONSENT TO OPERATION and/or OTHER PROCEDURE.     ____________________________________  _________________________________        ______________________________  Signature of Patient    Signature of Responsible Person                Printed Name of Responsible Person                                      ____________________________________  _____________________________                ________________________________  Signature of Witness        Date  Time         Relationship to Patient    STATEMENT OF PHYSICIAN My signature below affirms that prior to the time of the procedure; I have explained to the patient and/or his/her legal representative, the risks and benefits involved in the proposed treatment and any reasonable alternative to the proposed treatment. I have also explained the risks and benefits involved in refusal of the proposed treatment and alternatives to the proposed treatment and have  answered the patient's questions. If I have a significant financial interest in a co-management agreement or a significant financial interest in any product or implant, or other significant relationship used in this procedure/surgery, I have disclosed this and had a discussion with my patient.     _____________________________________________________              _____________________________  (Signature of Physician)                                                                                         (Date)                                   (Time)  Patient Name: Izabella Soto      : 3/20/1955      Printed: 2025     Medical Record #: [id number]                                      Page 1 of 1

## (undated) NOTE — LETTER
[facility]  Administration of Anesthesia  Izabella FULLER agree to be cared for by a physician anesthesiologist alone and/or with a nurse anesthetist, who is specially trained to monitor me and give me medicine to put me to sleep or keep me comfortable during my procedure    I understand that my anesthesiologist and/or anesthetist is not an employee or agent of [facility] or [employer]. He or she works for [facility]    As the patient asking for anesthesia services, I agree to:  Allow the anesthesiologist (anesthesia doctor) to give me medicine and do additional procedures as necessary. Some examples are: Starting or using an “IV” to give me medicine, fluids or blood during my procedure, and having a breathing tube placed to help me breathe when I’m asleep (intubation). In the event that my heart stops working properly, I understand that my anesthesiologist will make every effort to sustain my life, unless otherwise directed by [facility] Do Not Resuscitate documents.  Tell my anesthesia doctor before my procedure:  If I am pregnant.  The last time that I ate or drank.  iii. All of the medicines I take (including prescriptions, herbal supplements, and pills I can buy without a prescription (including street drugs/illegal medications). Failure to inform my anesthesiologist about these medicines may increase my risk of anesthetic complications.  iv.If I am allergic to anything or have had a reaction to anesthesia before.  I understand how the anesthesia medicine will help me (benefits).  I understand that with any type of anesthesia medicine there are risks:  The most common risks are: nausea, vomiting, sore throat, muscle soreness, damage to my eyes, mouth, or teeth (from breathing tube placement).  Rare risks include: remembering what happened during my procedure, allergic reactions to medications, injury to my airway, heart, lungs, vision, nerves, or muscles  and in extremely rare instances death.  My doctor has explained to me other choices available to me for my care (alternatives).  Pregnant Patients (“epidural”):  I understand that the risks of having an epidural (medicine given into my back to help control pain during labor), include itching, low blood pressure, difficulty urinating, headache or slowing of the baby’s heart. Very rare risks include infection, bleeding, seizure, irregular heart rhythms and nerve injury.  Regional Anesthesia (“spinal”, “epidural”, & “nerve blocks”):  I understand that rare but potential complications include headache, bleeding, infection, seizure, irregular heart rhythms, and nerve injury.    _____________________________________________________________________________  Patient (or Representative) Signature/Relationship to Patient  Date   Time    _____________________________________________________________________________   Name (if used)    Language/Organization   Time    _____________________________________________________________________________  Nurse Anesthetist Signature     Date   Time  _____________________________________________________________________________  Anesthesiologist Signature     Date   Time  I have discussed the procedure and information above with the patient (or patient’s representative) and answered their questions. The patient or their representative has agreed to have anesthesia services.    _____________________________________________________________________________  Witness        Date   Time  I have verified that the signature is that of the patient or patient’s representative, and that it was signed before the procedure  Patient Name: Izabella Soto     : 3/20/1955                 Printed: 2025 at 12:07 PM    Medical Record #: [id number]                                            Page 1 of 1  ----------ANESTHESIA CONSENT----------

## (undated) NOTE — LETTER
6/20/2020              Drake Ruiz        [address] [place] [id number]         Dear Ms. Veronica Camejo apologize for the delay in returning these results to you.     During your stomach endoscopy examination at [facility] on 5/26/